# Patient Record
Sex: FEMALE | Race: BLACK OR AFRICAN AMERICAN | Employment: PART TIME | URBAN - METROPOLITAN AREA
[De-identification: names, ages, dates, MRNs, and addresses within clinical notes are randomized per-mention and may not be internally consistent; named-entity substitution may affect disease eponyms.]

---

## 2018-03-22 ENCOUNTER — OP HISTORICAL/CONVERTED ENCOUNTER (OUTPATIENT)
Dept: OTHER | Age: 28
End: 2018-03-22

## 2019-11-05 ENCOUNTER — ED HISTORICAL/CONVERTED ENCOUNTER (OUTPATIENT)
Dept: OTHER | Age: 29
End: 2019-11-05

## 2022-12-12 ENCOUNTER — HOSPITAL ENCOUNTER (EMERGENCY)
Age: 32
Discharge: HOME | End: 2022-12-12
Attending: EMERGENCY MEDICINE
Payer: MEDICAID

## 2022-12-12 VITALS
HEART RATE: 93 BPM | RESPIRATION RATE: 15 BRPM | DIASTOLIC BLOOD PRESSURE: 85 MMHG | HEIGHT: 66 IN | SYSTOLIC BLOOD PRESSURE: 130 MMHG | OXYGEN SATURATION: 100 % | TEMPERATURE: 98.3 F | BODY MASS INDEX: 19.29 KG/M2 | WEIGHT: 120 LBS

## 2022-12-12 DIAGNOSIS — R45.851 SUICIDAL IDEATION: Primary | ICD-10-CM

## 2022-12-12 DIAGNOSIS — F19.10 SUBSTANCE ABUSE (HCC): ICD-10-CM

## 2022-12-12 LAB
ALBUMIN SERPL-MCNC: 3.1 G/DL (ref 3.5–5)
ALBUMIN/GLOB SERPL: 0.8 {RATIO} (ref 1.1–2.2)
ALP SERPL-CCNC: 62 U/L (ref 45–117)
ALT SERPL-CCNC: 22 U/L (ref 12–78)
AMPHET UR QL SCN: NEGATIVE
ANION GAP SERPL CALC-SCNC: 8 MMOL/L (ref 5–15)
APPEARANCE UR: ABNORMAL
AST SERPL W P-5'-P-CCNC: 17 U/L (ref 15–37)
BACTERIA URNS QL MICRO: ABNORMAL /HPF
BARBITURATES UR QL SCN: NEGATIVE
BASOPHILS # BLD: 0.1 K/UL (ref 0–0.1)
BASOPHILS NFR BLD: 1 % (ref 0–1)
BENZODIAZ UR QL: NEGATIVE
BILIRUB SERPL-MCNC: 0.3 MG/DL (ref 0.2–1)
BILIRUB UR QL: NEGATIVE
BUN SERPL-MCNC: 14 MG/DL (ref 6–20)
BUN/CREAT SERPL: 17 (ref 12–20)
CA-I BLD-MCNC: 8.7 MG/DL (ref 8.5–10.1)
CANNABINOIDS UR QL SCN: NEGATIVE
CHLORIDE SERPL-SCNC: 113 MMOL/L (ref 97–108)
CO2 SERPL-SCNC: 22 MMOL/L (ref 21–32)
COCAINE UR QL SCN: POSITIVE
COLOR UR: ABNORMAL
CREAT SERPL-MCNC: 0.84 MG/DL (ref 0.55–1.02)
DIFFERENTIAL METHOD BLD: NORMAL
DRUG SCRN COMMENT,DRGCM: ABNORMAL
EOSINOPHIL # BLD: 0.3 K/UL (ref 0–0.4)
EOSINOPHIL NFR BLD: 4 % (ref 0–7)
ERYTHROCYTE [DISTWIDTH] IN BLOOD BY AUTOMATED COUNT: 13.7 % (ref 11.5–14.5)
GLOBULIN SER CALC-MCNC: 3.8 G/DL (ref 2–4)
GLUCOSE SERPL-MCNC: 99 MG/DL (ref 65–100)
GLUCOSE UR STRIP.AUTO-MCNC: NEGATIVE MG/DL
HCT VFR BLD AUTO: 38.6 % (ref 35–47)
HGB BLD-MCNC: 12.5 G/DL (ref 11.5–16)
HGB UR QL STRIP: ABNORMAL
IMM GRANULOCYTES # BLD AUTO: 0 K/UL (ref 0–0.04)
IMM GRANULOCYTES NFR BLD AUTO: 0 % (ref 0–0.5)
KETONES UR QL STRIP.AUTO: NEGATIVE MG/DL
LEUKOCYTE ESTERASE UR QL STRIP.AUTO: ABNORMAL
LYMPHOCYTES # BLD: 1.8 K/UL (ref 0.8–3.5)
LYMPHOCYTES NFR BLD: 24 % (ref 12–49)
MCH RBC QN AUTO: 28 PG (ref 26–34)
MCHC RBC AUTO-ENTMCNC: 32.4 G/DL (ref 30–36.5)
MCV RBC AUTO: 86.5 FL (ref 80–99)
METHADONE UR QL: NEGATIVE
MONOCYTES # BLD: 0.5 K/UL (ref 0–1)
MONOCYTES NFR BLD: 6 % (ref 5–13)
MUCOUS THREADS URNS QL MICRO: ABNORMAL /LPF
NEUTS SEG # BLD: 4.8 K/UL (ref 1.8–8)
NEUTS SEG NFR BLD: 65 % (ref 32–75)
NITRITE UR QL STRIP.AUTO: POSITIVE
NRBC # BLD: 0 K/UL (ref 0–0.01)
NRBC BLD-RTO: 0 PER 100 WBC
OPIATES UR QL: NEGATIVE
PCP UR QL: NEGATIVE
PH UR STRIP: 5 [PH] (ref 5–8)
PLATELET # BLD AUTO: 304 K/UL (ref 150–400)
PMV BLD AUTO: 9.5 FL (ref 8.9–12.9)
POTASSIUM SERPL-SCNC: 3.8 MMOL/L (ref 3.5–5.1)
PROT SERPL-MCNC: 6.9 G/DL (ref 6.4–8.2)
PROT UR STRIP-MCNC: ABNORMAL MG/DL
RBC # BLD AUTO: 4.46 M/UL (ref 3.8–5.2)
RBC #/AREA URNS HPF: >100 /HPF (ref 0–5)
SODIUM SERPL-SCNC: 143 MMOL/L (ref 136–145)
SP GR UR REFRACTOMETRY: 1.02 (ref 1–1.03)
UA: UC IF INDICATED,UAUC: ABNORMAL
UROBILINOGEN UR QL STRIP.AUTO: 0.1 EU/DL (ref 0.1–1)
WBC # BLD AUTO: 7.4 K/UL (ref 3.6–11)
WBC URNS QL MICRO: ABNORMAL /HPF (ref 0–4)

## 2022-12-12 PROCEDURE — 99283 EMERGENCY DEPT VISIT LOW MDM: CPT

## 2022-12-12 PROCEDURE — 80053 COMPREHEN METABOLIC PANEL: CPT

## 2022-12-12 PROCEDURE — 36415 COLL VENOUS BLD VENIPUNCTURE: CPT

## 2022-12-12 PROCEDURE — 87186 SC STD MICRODIL/AGAR DIL: CPT

## 2022-12-12 PROCEDURE — 87086 URINE CULTURE/COLONY COUNT: CPT

## 2022-12-12 PROCEDURE — 81001 URINALYSIS AUTO W/SCOPE: CPT

## 2022-12-12 PROCEDURE — 87077 CULTURE AEROBIC IDENTIFY: CPT

## 2022-12-12 PROCEDURE — 85025 COMPLETE CBC W/AUTO DIFF WBC: CPT

## 2022-12-12 PROCEDURE — 80307 DRUG TEST PRSMV CHEM ANLYZR: CPT

## 2022-12-12 NOTE — ED PROVIDER NOTES
EMERGENCY DEPARTMENT HISTORY AND PHYSICAL EXAM      Date: 2022  Patient Name: Angelique Maurer    History of Presenting Illness     Chief Complaint   Patient presents with    Mental Health Problem       History Provided By: Patient and Law Enforcement    HPI: Angelique Maurer, 33 y.o. female presents to the emergency department under an E CO taken out by her sister due to patient having declared suicidal ideation. Patient also admits to substance abuse. Patient denies AVH, denies any somatic complaints at this time. There are no other complaints, changes, or physical findings at this time. Past History     Past Medical History:  Past Medical History:   Diagnosis Date    Laceration     right 2 3 fingers    Pregnancy     13 weeks    Smoker        Past Surgical History:  Past Surgical History:   Procedure Laterality Date    HX  SECTION      HX CYST REMOVAL      mult surgeries on pylonidal cyst    HX HERNIA REPAIR         Family History:  No family history on file. Social History:  Social History     Tobacco Use    Smoking status: Some Days   Substance Use Topics    Alcohol use: No       Allergies: Allergies   Allergen Reactions    Tylenol [Acetaminophen] Nausea Only    Zofran [Ondansetron Hcl (Pf)] Swelling       PCP: Swapnil, Not On File, [place]    No current facility-administered medications on file prior to encounter. Current Outpatient Medications on File Prior to Encounter   Medication Sig Dispense Refill    oxyCODONE IR (ROXICODONE) 10 mg tab immediate release tablet Take 1 Tab by mouth every four (4) hours as needed for Pain. Max Daily Amount: 60 mg. 40 Tab 0       Review of Systems   Review of Systems  Review of Systems   Constitutional: Negative for chills and fever. HENT: Negative for sinus pressure and sinus pain. Eyes: Negative for photophobia and redness. Respiratory: Negative for shortness of breath and wheezing. Cardiovascular: Negative for chest pain and palpitations. Gastrointestinal: Negative for abdominal pain and nausea. Genitourinary: Negative for flank pain and hematuria. Musculoskeletal: Negative for arthralgias and gait problem. Skin: Negative for color change and pallor. Neurological: Negative for dizziness and weakness. Physical Exam   Physical Exam  Physical Exam  Constitutional:       General: No acute distress. Appearance: Normal appearance. Not toxic-appearing. HENT:      Head: Normocephalic and atraumatic. Nose: Nose normal.      Mouth/Throat:      Mouth: Mucous membranes are moist.   Eyes:      Extraocular Movements: Extraocular movements intact. Pupils: Pupils are equal, round, and reactive to light. Cardiovascular:      Rate and Rhythm: Normal rate. Pulses: Normal pulses. Pulmonary:      Effort: Pulmonary effort is normal.      Breath sounds: No stridor. Abdominal:      General: Abdomen is flat. There is no distension. Musculoskeletal:         General: Normal range of motion. Cervical back: Normal range of motion and neck supple. Skin:     General: Skin is warm and dry. Capillary Refill: Capillary refill takes less than 2 seconds. Neurological:      General: No focal deficit present. Mental Status: Aert and oriented to person, place, and time.    Psychiatric:         Mood and Affect: Mood abnormal.         Behavior: Behavior abnormal.     Lab and Diagnostic Study Results   Labs -     Recent Results (from the past 12 hour(s))   CBC WITH AUTOMATED DIFF    Collection Time: 12/12/22  5:02 PM   Result Value Ref Range    WBC 7.4 3.6 - 11.0 K/uL    RBC 4.46 3.80 - 5.20 M/uL    HGB 12.5 11.5 - 16.0 g/dL    HCT 38.6 35.0 - 47.0 %    MCV 86.5 80.0 - 99.0 FL    MCH 28.0 26.0 - 34.0 PG    MCHC 32.4 30.0 - 36.5 g/dL    RDW 13.7 11.5 - 14.5 %    PLATELET 221 256 - 062 K/uL    MPV 9.5 8.9 - 12.9 FL    NRBC 0.0 0.0  WBC    ABSOLUTE NRBC 0.00 0.00 - 0.01 K/uL    NEUTROPHILS 65 32 - 75 %    LYMPHOCYTES 24 12 - 49 %    MONOCYTES 6 5 - 13 %    EOSINOPHILS 4 0 - 7 %    BASOPHILS 1 0 - 1 %    IMMATURE GRANULOCYTES 0 0 - 0.5 %    ABS. NEUTROPHILS 4.8 1.8 - 8.0 K/UL    ABS. LYMPHOCYTES 1.8 0.8 - 3.5 K/UL    ABS. MONOCYTES 0.5 0.0 - 1.0 K/UL    ABS. EOSINOPHILS 0.3 0.0 - 0.4 K/UL    ABS. BASOPHILS 0.1 0.0 - 0.1 K/UL    ABS. IMM. GRANS. 0.0 0.00 - 0.04 K/UL    DF AUTOMATED     METABOLIC PANEL, COMPREHENSIVE    Collection Time: 12/12/22  5:02 PM   Result Value Ref Range    Sodium 143 136 - 145 mmol/L    Potassium 3.8 3.5 - 5.1 mmol/L    Chloride 113 (H) 97 - 108 mmol/L    CO2 22 21 - 32 mmol/L    Anion gap 8 5 - 15 mmol/L    Glucose 99 65 - 100 mg/dL    BUN 14 6 - 20 mg/dL    Creatinine 0.84 0.55 - 1.02 mg/dL    BUN/Creatinine ratio 17 12 - 20      eGFR >60 >60 ml/min/1.73m2    Calcium 8.7 8.5 - 10.1 mg/dL    Bilirubin, total 0.3 0.2 - 1.0 mg/dL    AST (SGOT) 17 15 - 37 U/L    ALT (SGPT) 22 12 - 78 U/L    Alk.  phosphatase 62 45 - 117 U/L    Protein, total 6.9 6.4 - 8.2 g/dL    Albumin 3.1 (L) 3.5 - 5.0 g/dL    Globulin 3.8 2.0 - 4.0 g/dL    A-G Ratio 0.8 (L) 1.1 - 2.2     URINALYSIS W/ REFLEX CULTURE    Collection Time: 12/12/22  5:02 PM    Specimen: Urine   Result Value Ref Range    Color Juanita      Appearance Turbid (A) Clear      Specific gravity 1.025 1.003 - 1.030      pH (UA) 5.0 5.0 - 8.0      Protein Trace (A) Negative mg/dL    Glucose Negative Negative mg/dL    Ketone Negative Negative mg/dL    Bilirubin Negative Negative      Blood Large (A) Negative      Urobilinogen 0.1 0.1 - 1.0 EU/dL    Nitrites Positive (A) Negative      Leukocyte Esterase Small (A) Negative      UA:UC IF INDICATED Urine Culture Ordered (A) Culture not indicated by UA result      WBC 20-50 0 - 4 /hpf    RBC >100 (H) 0 - 5 /hpf    Bacteria 4+ (A) Negative /hpf    Mucus 2+ (A) Negative /lpf   DRUG SCREEN, URINE    Collection Time: 12/12/22  5:02 PM   Result Value Ref Range    AMPHETAMINES Negative Negative      BARBITURATES Negative Negative BENZODIAZEPINES Negative Negative      COCAINE Positive (A) Negative      METHADONE Negative Negative      OPIATES Negative Negative      PCP(PHENCYCLIDINE) Negative Negative      THC (TH-CANNABINOL) Negative Negative      Drug screen comment        This test is a screen for drugs of abuse in a medical setting only (i.e., they are unconfirmed results and as such must not be used for non-medical purposes, e.g.,employment testing, legal testing). Due to its inherent nature, false positive (FP) and false negative (FN) results may be obtained. Therefore, if necessary for medical care, recommend confirmation of positive findings by GC/MS. Radiologic Studies -   @lastxrresult@  CT Results  (Last 48 hours)      None          CXR Results  (Last 48 hours)      None            Medical Decision Making and ED Course   Differential Diagnosis & Medical Decision Making Provider Note:       - I am the first provider for this patient. I reviewed the vital signs, available nursing notes, past medical history, past surgical history, family history and social history. The patients presenting problems have been discussed, and they are in agreement with the care plan formulated and outlined with them. I have encouraged them to ask questions as they arise throughout their visit. Vital Signs-Reviewed the patient's vital signs. Patient Vitals for the past 12 hrs:   Temp Pulse Resp BP SpO2   12/12/22 1653 98.3 °F (36.8 °C) 93 15 130/85 100 %       ED Course:   Patient with passive suicidal ideation and substance abuse. Needs to be evaluated by D 19. Disposition   Disposition: Awaiting evaluation by 19 to determine admission versus discharge home. Diagnosis/Clinical Impression     Clinical Impression:   1. Suicidal ideation    2. Substance abuse [facility])        Attestations: Lolis Carbajal MD, am the primary clinician of record.     Please note that this dictation was completed with [employer], the [employer] voice recognition software. Quite often unanticipated grammatical, syntax, homophones, and other interpretive errors are inadvertently transcribed by the computer software. Please disregard these errors. Please excuse any errors that have escaped final proofreading. Thank you.

## 2022-12-12 NOTE — BSMART NOTE
Pt presents to ER on ECO taken out by sister, executed by En Hare at 1610PM.  [facility] with D19 aware that pt is here and will call to prescreen pt

## 2022-12-12 NOTE — Clinical Note
[facility] EMERGENCY DEPT  [address] [place] [id number]  [phone]    Work/School Note    Date: 12/12/2022    To Whom It May concern:      Veronique Cota was seen and treated today in the emergency room by the following provider(s):  Attending Provider: Nitish Mendieta MD  Physician: Gary Marcano MD.      Veronique Cota is excused from work/school on 12/12/22. She is clear to return to work/school on 12/13/22.         Sincerely,          Tamia George MD

## 2022-12-12 NOTE — ED TRIAGE NOTES
[facility] 15 pt's sister placed an ECO stating that pt is in crisis and sent text messages to her mother expressing suicidal thoughts; sister placed in ECO paperwork that pt is on crack and heroin, has 6 children ages 6-3, has her electric and gas shut off, that she is bipolar and can be aggressive; pt denies SI/HI; states that she started using drugs when her friend who was missing turned up dead about 2 years ago; pt denies any thoughts of SI/HI

## 2022-12-12 NOTE — BSMART NOTE
Juanita with D19 completing prescreen at this time via [address] [place] [address].   All available labs, face sheet, and copy of ECO faxed per her request

## 2022-12-13 NOTE — DISCHARGE INSTRUCTIONS
[facility]   [address], [facility], [address] Crisis [phone]  *D-19 2000 [facility], 454 [facility] or Crisis [phone] Toll free [address], [employer], 6901 [facility] or [phone] for [facility]   [address] Jared[address]rag[address] Crisis [phone]  [address], [place], [address] Crisis [phone]  [address] Neck Multiple locations in [place], [place], 3 [facility] [address], [address] [facility] [address], [address] Crisis [phone]  [facility] 51 [facility], [facility]     Other [address] Providers with Sliding Scale or [address], [place], [address] [facility] [address]  [facility] 150 Medical [facility], [address] [facility], 1008 [facility]  [employer] [address], [facility], 1515 [facility], [address]  Ya 5800 [facility], 1000 [facility] Psychiatry [phone]    Psychiatrists and Nurse Practitioners   (Most of these practices also have therapists)    [address] 100 [facility] [place], 100 [facility] [address], [place], 1015 Keisha [address]cl[address] 72, [place], [facility] 16 [address] [place], [address]40 [place] [address] [place]  [facility] [phone], [facility] [phone], [place] [phone], and [address]  7213 [facility] or [facility] call [phone] or [place] call [phone]  [facility] Neighbor [facility]4 1106 [facility], [place], 2801 [facility] 1465 [facility] [address] [place], 1421 [facility] 119 8550 S [facility], 1306 [facility] Virtual appts. [facility] [address], [place], 3330 [address]  *Arabella Soriano NP [place], 9601 [facility] Online   [facility] Megan Deocleciano [address]41, Anisha, [address]  Neuropsychiatric and Counseling 53 [facility], [place], 15 [facility]  610 [facility] Counseling [address], [place], 1550 [facility]              (Primarily therapy practice but there is NP here does medication management)  [address] 96 [facility], [address] [facility]  7500 [facility], [place], [address] Have [place] and [place] locations also. Virtual appointments also.     [facility] 529 [facility], [address]  [address] 201 [facility], 1035 Hollis Abraham [place]  [facility], 700 [facility] 621 , [place], [address] [phone] and [place] [phone]    Therapists  *Carlitos Bower, [address] 2606 [facility] [place] 301 [facility] 83,8Th Floor 1 [place], 3800 [facility] [phone] or YOXTTDFI [phone]  *[facility]  2801 [place], [address] 2401 [facility] [address], [place] [address] [place], [place] 5546 [address] [place] [address] Dr [facility] 1801 [facility] 224 [facility] [phone]   *[facility] [address] [place] 5546 [facility].  [address] Janina franklin, 403 N [facility] t

## 2022-12-13 NOTE — ED NOTES
Pt refusing to dress in gown at this time. Wants to wait until she finds out what is going to happen.

## 2022-12-15 LAB
BACTERIA SPEC CULT: ABNORMAL
COLONY COUNT,CNT: ABNORMAL
SPECIAL REQUESTS,SREQ: ABNORMAL

## 2023-12-31 ENCOUNTER — HOSPITAL ENCOUNTER (EMERGENCY)
Facility: HOSPITAL | Age: 33
Discharge: HOME | End: 2023-12-31

## 2023-12-31 VITALS
TEMPERATURE: 98.2 F | DIASTOLIC BLOOD PRESSURE: 86 MMHG | OXYGEN SATURATION: 98 % | SYSTOLIC BLOOD PRESSURE: 137 MMHG | WEIGHT: 190 LBS | HEART RATE: 84 BPM | RESPIRATION RATE: 18 BRPM | BODY MASS INDEX: 30.53 KG/M2 | HEIGHT: 66 IN

## 2023-12-31 DIAGNOSIS — R11.2 NAUSEA AND VOMITING, UNSPECIFIED VOMITING TYPE: Primary | ICD-10-CM

## 2023-12-31 LAB
ALBUMIN SERPL-MCNC: 4 G/DL (ref 3.5–5)
ALBUMIN/GLOB SERPL: 0.7 (ref 1.1–2.2)
ALP SERPL-CCNC: 68 U/L (ref 45–117)
ALT SERPL-CCNC: 21 U/L (ref 12–78)
ANION GAP SERPL CALC-SCNC: 8 MMOL/L (ref 5–15)
APPEARANCE UR: ABNORMAL
AST SERPL W P-5'-P-CCNC: 25 U/L (ref 15–37)
BACTERIA URNS QL MICRO: NEGATIVE /HPF
BASOPHILS # BLD: 0.1 K/UL (ref 0–0.1)
BASOPHILS NFR BLD: 1 % (ref 0–1)
BILIRUB SERPL-MCNC: 0.6 MG/DL (ref 0.2–1)
BILIRUB UR QL: NEGATIVE
BUN SERPL-MCNC: 12 MG/DL (ref 6–20)
BUN/CREAT SERPL: 16 (ref 12–20)
CA-I BLD-MCNC: 9.4 MG/DL (ref 8.5–10.1)
CHLORIDE SERPL-SCNC: 104 MMOL/L (ref 97–108)
CO2 SERPL-SCNC: 24 MMOL/L (ref 21–32)
COLOR UR: ABNORMAL
CREAT SERPL-MCNC: 0.76 MG/DL (ref 0.55–1.02)
DIFFERENTIAL METHOD BLD: ABNORMAL
EOSINOPHIL # BLD: 0.1 K/UL (ref 0–0.4)
EOSINOPHIL NFR BLD: 1 % (ref 0–7)
EPITH CASTS URNS QL MICRO: ABNORMAL /LPF
ERYTHROCYTE [DISTWIDTH] IN BLOOD BY AUTOMATED COUNT: 16.6 % (ref 11.5–14.5)
FLUAV AG NPH QL IA: NEGATIVE
FLUBV AG NOSE QL IA: NEGATIVE
GLOBULIN SER CALC-MCNC: 5.7 G/DL (ref 2–4)
GLUCOSE SERPL-MCNC: 80 MG/DL (ref 65–100)
GLUCOSE UR STRIP.AUTO-MCNC: NEGATIVE MG/DL
HCG, URINE, POC: NEGATIVE
HCT VFR BLD AUTO: 37.8 % (ref 35–47)
HGB BLD-MCNC: 12.1 G/DL (ref 11.5–16)
HGB UR QL STRIP: ABNORMAL
IMM GRANULOCYTES # BLD AUTO: 0 K/UL (ref 0–0.04)
IMM GRANULOCYTES NFR BLD AUTO: 0 % (ref 0–0.5)
KETONES UR QL STRIP.AUTO: 20 MG/DL
LEUKOCYTE ESTERASE UR QL STRIP.AUTO: NEGATIVE
LIPASE SERPL-CCNC: 25 U/L (ref 13–75)
LYMPHOCYTES # BLD: 2.5 K/UL (ref 0.8–3.5)
LYMPHOCYTES NFR BLD: 28 % (ref 12–49)
Lab: NORMAL
MAGNESIUM SERPL-MCNC: 2.4 MG/DL (ref 1.6–2.4)
MAGNESIUM SERPL-MCNC: 2.6 MG/DL (ref 1.6–2.4)
MCH RBC QN AUTO: 26.1 PG (ref 26–34)
MCHC RBC AUTO-ENTMCNC: 32 G/DL (ref 30–36.5)
MCV RBC AUTO: 81.6 FL (ref 80–99)
MONOCYTES # BLD: 0.7 K/UL (ref 0–1)
MONOCYTES NFR BLD: 8 % (ref 5–13)
MUCOUS THREADS URNS QL MICRO: ABNORMAL /LPF
NEGATIVE QC PASS/FAIL: NORMAL
NEUTS SEG # BLD: 5.6 K/UL (ref 1.8–8)
NEUTS SEG NFR BLD: 62 % (ref 32–75)
NITRITE UR QL STRIP.AUTO: NEGATIVE
NRBC # BLD: 0 K/UL (ref 0–0.01)
NRBC BLD-RTO: 0 PER 100 WBC
PH UR STRIP: 5 (ref 5–8)
PLATELET # BLD AUTO: 480 K/UL (ref 150–400)
PMV BLD AUTO: 9.9 FL (ref 8.9–12.9)
POSITIVE QC PASS/FAIL: NORMAL
POTASSIUM SERPL-SCNC: 3.5 MMOL/L (ref 3.5–5.1)
POTASSIUM SERPL-SCNC: 3.6 MMOL/L (ref 3.5–5.1)
PROT SERPL-MCNC: 9.7 G/DL (ref 6.4–8.2)
PROT UR STRIP-MCNC: 30 MG/DL
RBC # BLD AUTO: 4.63 M/UL (ref 3.8–5.2)
RBC #/AREA URNS HPF: ABNORMAL /HPF (ref 0–5)
SARS-COV-2 RDRP RESP QL NAA+PROBE: NOT DETECTED
SODIUM SERPL-SCNC: 136 MMOL/L (ref 136–145)
SP GR UR REFRACTOMETRY: 1.02 (ref 1–1.03)
UROBILINOGEN UR QL STRIP.AUTO: 0.1 EU/DL (ref 0.1–1)
WBC # BLD AUTO: 8.9 K/UL (ref 3.6–11)
WBC URNS QL MICRO: ABNORMAL /HPF (ref 0–4)

## 2023-12-31 PROCEDURE — 83690 ASSAY OF LIPASE: CPT

## 2023-12-31 PROCEDURE — 96361 HYDRATE IV INFUSION ADD-ON: CPT

## 2023-12-31 PROCEDURE — 36415 COLL VENOUS BLD VENIPUNCTURE: CPT

## 2023-12-31 PROCEDURE — 99284 EMERGENCY DEPT VISIT MOD MDM: CPT

## 2023-12-31 PROCEDURE — 80053 COMPREHEN METABOLIC PANEL: CPT

## 2023-12-31 PROCEDURE — 84132 ASSAY OF SERUM POTASSIUM: CPT

## 2023-12-31 PROCEDURE — 87804 INFLUENZA ASSAY W/OPTIC: CPT

## 2023-12-31 PROCEDURE — 83735 ASSAY OF MAGNESIUM: CPT

## 2023-12-31 PROCEDURE — 2580000003 HC RX 258

## 2023-12-31 PROCEDURE — 87635 SARS-COV-2 COVID-19 AMP PRB: CPT

## 2023-12-31 PROCEDURE — 81001 URINALYSIS AUTO W/SCOPE: CPT

## 2023-12-31 PROCEDURE — 6360000002 HC RX W HCPCS

## 2023-12-31 PROCEDURE — 85025 COMPLETE CBC W/AUTO DIFF WBC: CPT

## 2023-12-31 PROCEDURE — 96374 THER/PROPH/DIAG INJ IV PUSH: CPT

## 2023-12-31 RX ORDER — PROMETHAZINE HYDROCHLORIDE 12.5 MG/1
12.5 TABLET ORAL 4 TIMES DAILY PRN
Qty: 20 TABLET | Refills: 0 | Status: SHIPPED | OUTPATIENT
Start: 2023-12-31 | End: 2024-01-07

## 2023-12-31 RX ORDER — 0.9 % SODIUM CHLORIDE 0.9 %
1000 INTRAVENOUS SOLUTION INTRAVENOUS ONCE
Status: COMPLETED | OUTPATIENT
Start: 2023-12-31 | End: 2023-12-31

## 2023-12-31 RX ORDER — METOCLOPRAMIDE HYDROCHLORIDE 5 MG/ML
10 INJECTION INTRAMUSCULAR; INTRAVENOUS
Status: COMPLETED | OUTPATIENT
Start: 2023-12-31 | End: 2023-12-31

## 2023-12-31 RX ADMIN — SODIUM CHLORIDE 1000 ML: 9 INJECTION, SOLUTION INTRAVENOUS at 18:27

## 2023-12-31 RX ADMIN — METOCLOPRAMIDE 10 MG: 5 INJECTION, SOLUTION INTRAMUSCULAR; INTRAVENOUS at 18:26

## 2023-12-31 ASSESSMENT — LIFESTYLE VARIABLES
HOW OFTEN DO YOU HAVE A DRINK CONTAINING ALCOHOL: NEVER
HOW MANY STANDARD DRINKS CONTAINING ALCOHOL DO YOU HAVE ON A TYPICAL DAY: PATIENT DOES NOT DRINK

## 2023-12-31 ASSESSMENT — PAIN DESCRIPTION - LOCATION: LOCATION: HEAD

## 2023-12-31 ASSESSMENT — PAIN SCALES - GENERAL: PAINLEVEL_OUTOF10: 7

## 2023-12-31 NOTE — ED PROVIDER NOTES
[facility] EMERGENCY DEPT  EMERGENCY DEPARTMENT HISTORY AND PHYSICAL EXAM      Date: 2023  Patient Name: Spencer Bradley  MRN: [id number]  YOB: 1990  Date of evaluation: 2023  Provider: Edilson Ceja PA-C   Note Started: 5:16 PM EST 23    HISTORY OF PRESENT ILLNESS     Chief Complaint   Patient presents with    Emesis       History Provided By: Patient    HPI: Spencer Bradley is a 33 y.o. female with past medical history as listed below presents emergency department complaining of persistent nausea and vomiting weakness that started yesterday.  Also reporting increased fatigue and nasal drainage coming from the right nostril.  Patient reports she has a history of an abscessed tooth that was pulled in May of this year states that she had a lot of drainage right nostril before her tooth was extracted and believes that it was the abscess draining from her nostril.  Is concerned that she is having an abscess draining from her nose. denies any fevers, chills, night sweats, photophobia, sore throat, known sick contacts, chest pain, shortness of breath, change in bowel or bladder habits      PAST MEDICAL HISTORY   Past Medical History:  Past Medical History:   Diagnosis Date    Laceration     right 2 3 fingers    Pregnancy     13 weeks    Smoker        Past Surgical History:  Past Surgical History:   Procedure Laterality Date     SECTION      CYST REMOVAL      mult surgeries on pylonidal cyst    HERNIA REPAIR         Family History:  History reviewed. No pertinent family history.    Social History:  Social History     Tobacco Use    Smoking status: Some Days   Substance Use Topics    Alcohol use: No       Allergies:  Allergies   Allergen Reactions    Ondansetron Swelling and Anaphylaxis    Acetaminophen Nausea Only       PCP: No primary care provider on file.    Current Meds:   No current facility-administered medications for this encounter.     Current Outpatient Medications   Medication Sig

## 2023-12-31 NOTE — ED TRIAGE NOTES
Pt complains of vomiting and weakness that started yesterday. States she has large amount of nasal drainage coming out of the right nostril.

## 2024-01-01 NOTE — ED NOTES
Assumed care of pt at this time. Pt is alert and oriented x4 with no signs of respiratory distress. Pt is in TC5 with no complaints at this time. Pt is receiving normal saline bolus.

## 2024-09-04 ENCOUNTER — HOSPITAL ENCOUNTER (EMERGENCY)
Facility: HOSPITAL | Age: 34
Discharge: HOME | End: 2024-09-04
Attending: EMERGENCY MEDICINE

## 2024-09-04 VITALS
WEIGHT: 145 LBS | HEART RATE: 97 BPM | DIASTOLIC BLOOD PRESSURE: 84 MMHG | RESPIRATION RATE: 18 BRPM | BODY MASS INDEX: 23.3 KG/M2 | SYSTOLIC BLOOD PRESSURE: 142 MMHG | HEIGHT: 66 IN | TEMPERATURE: 98.3 F | OXYGEN SATURATION: 98 %

## 2024-09-04 DIAGNOSIS — B96.89 BACTERIAL VAGINOSIS: Primary | ICD-10-CM

## 2024-09-04 DIAGNOSIS — N76.0 BACTERIAL VAGINOSIS: Primary | ICD-10-CM

## 2024-09-04 DIAGNOSIS — Z20.2 POSSIBLE EXPOSURE TO STI: ICD-10-CM

## 2024-09-04 DIAGNOSIS — H60.391 INFECTIVE OTITIS EXTERNA OF RIGHT EAR: ICD-10-CM

## 2024-09-04 LAB
CLUE CELLS VAG QL WET PREP: ABNORMAL
T VAGINALIS VAG QL WET PREP: ABNORMAL
YEAST: ABNORMAL

## 2024-09-04 PROCEDURE — 6360000002 HC RX W HCPCS

## 2024-09-04 PROCEDURE — 87210 SMEAR WET MOUNT SALINE/INK: CPT

## 2024-09-04 PROCEDURE — 87491 CHLMYD TRACH DNA AMP PROBE: CPT

## 2024-09-04 PROCEDURE — 96372 THER/PROPH/DIAG INJ SC/IM: CPT

## 2024-09-04 PROCEDURE — 2500000003 HC RX 250 WO HCPCS

## 2024-09-04 PROCEDURE — 99284 EMERGENCY DEPT VISIT MOD MDM: CPT

## 2024-09-04 PROCEDURE — 6370000000 HC RX 637 (ALT 250 FOR IP)

## 2024-09-04 PROCEDURE — 87591 N.GONORRHOEAE DNA AMP PROB: CPT

## 2024-09-04 RX ORDER — PERMETHRIN 50 MG/G
CREAM TOPICAL
Qty: 60 G | Refills: 0 | Status: SHIPPED | OUTPATIENT
Start: 2024-09-04

## 2024-09-04 RX ORDER — HYDROXYZINE HYDROCHLORIDE 10 MG/1
50 TABLET, FILM COATED ORAL 3 TIMES DAILY PRN
COMMUNITY

## 2024-09-04 RX ORDER — CIPROFLOXACIN/HYDROCORTISONE 0.2 %-1 %
3 SUSPENSION, DROPS(FINAL DOSAGE FORM)(ML) OTIC (EAR) 2 TIMES DAILY
Qty: 10 ML | Refills: 0 | Status: SHIPPED | OUTPATIENT
Start: 2024-09-04 | End: 2024-09-11

## 2024-09-04 RX ORDER — DOXYCYCLINE HYCLATE 100 MG
100 TABLET ORAL 2 TIMES DAILY
Qty: 14 TABLET | Refills: 0 | Status: SHIPPED | OUTPATIENT
Start: 2024-09-04 | End: 2024-09-11

## 2024-09-04 RX ORDER — FAMOTIDINE 20 MG/1
TABLET, FILM COATED ORAL
COMMUNITY

## 2024-09-04 RX ORDER — OLANZAPINE 7.5 MG/1
7.5 TABLET, FILM COATED ORAL NIGHTLY
COMMUNITY

## 2024-09-04 RX ORDER — METRONIDAZOLE 500 MG/1
500 TABLET ORAL 2 TIMES DAILY
Qty: 14 TABLET | Refills: 0 | Status: SHIPPED | OUTPATIENT
Start: 2024-09-04 | End: 2024-09-11

## 2024-09-04 RX ORDER — DOXYCYCLINE 100 MG/1
100 CAPSULE ORAL ONCE
Status: COMPLETED | OUTPATIENT
Start: 2024-09-04 | End: 2024-09-04

## 2024-09-04 RX ADMIN — LIDOCAINE HYDROCHLORIDE 500 MG: 10 INJECTION, SOLUTION EPIDURAL; INFILTRATION; INTRACAUDAL; PERINEURAL at 17:44

## 2024-09-04 RX ADMIN — DOXYCYCLINE HYCLATE 100 MG: 100 CAPSULE ORAL at 17:44

## 2024-09-04 ASSESSMENT — PAIN SCALES - GENERAL
PAINLEVEL_OUTOF10: 0
PAINLEVEL_OUTOF10: 0

## 2024-09-04 ASSESSMENT — PAIN - FUNCTIONAL ASSESSMENT
PAIN_FUNCTIONAL_ASSESSMENT: 0-10
PAIN_FUNCTIONAL_ASSESSMENT: 0-10

## 2024-09-04 NOTE — DISCHARGE INSTRUCTIONS
Thank you for choosing our Emergency Department for your care.  It is our privilege to care for you in your time of need.  In the next several days, you may receive a survey via email or mailed to your home about your experience with our team.  We would greatly appreciate you taking a few minutes to complete the survey, as we use this information to learn what we have done well and what we could be doing better. Thank you for trusting us with your care!    Below you will find a list of your tests from today's visit.   Labs  Recent Results (from the past 12 hour(s))   Wet prep, genital    Collection Time: 09/04/24  5:26 PM    Specimen: Miscellaneous sample   Result Value Ref Range    Clue Cells, Wet Prep Clue Cells present (A) NONE SEEN      Trich, Wet Prep NONE SEEN NONE SEEN      Yeast, UA NONE SEEN NONE SEEN         Radiologic Studies  No orders to display     ------------------------------------------------------------------------------------------------------------  The evaluation and treatment you received in the Emergency Department were for an urgent problem. It is important that you follow-up with a doctor, nurse practitioner, or physician assistant to:  (1) confirm your diagnosis,  (2) re-evaluation of changes in your illness and treatment, and (3) for ongoing care. Please take your discharge instructions with you when you go to your follow-up appointment.     If you have any problem arranging a follow-up appointment, contact us!  If your symptoms become worse or you do not improve as expected, please return to us. We are available 24 hours a day.     If a prescription has been provided, please fill it as soon as possible to prevent a delay in treatment. If you have any questions or reservations about taking the medication due to side effects or interactions with other medications, please call your primary care provider or contact us directly.  Again, THANK YOU for choosing us to care for YOU!

## 2024-09-04 NOTE — ED TRIAGE NOTES
Started months ago  States she is    Picking at ears, says she felt something hard in both ears 3 months ago, havent been to   Not bothering her now.  3 wks ago ears swollen with fluid and draining.  Hx of abscess in mouth in July,   No issues with hearing or muffled sounds  Sores in skin all over body states she picks at them.  Hydrocortisone helped itching, but blisters dont go away

## 2024-09-04 NOTE — ED PROVIDER NOTES
[facility] EMERGENCY DEPT  EMERGENCY DEPARTMENT HISTORY AND PHYSICAL EXAM      Date: 2024  Patient Name: Spencer Bradley  MRN: [id number]  Birthdate 1990  Date of evaluation: 2024  Provider: CYNTHIA Vizcaino   Note Started: 5:01 PM EDT 24    HISTORY OF PRESENT ILLNESS     Chief Complaint   Patient presents with    Foreign Body in Ear    Blister     History Provided By: Patient    HPI: Spencer Bradley is a 33 y.o. female with past medical history as listed below who presents to the ED with several complaints.  First, she is complaining of a sensation of foreign body in both ears, in addition to some drainage and irritation for several weeks. Second, she is complaining of a rash and and is concerned that she may have scabies. She describes the rash as pruritic and states that it has been present for several months.  Lastly, when her partner left the room, she expressed concerns about possible exposure to STIs and is requesting prophylactic treatment.  She states that she feels safe at home and denies any physical or verbal abuse. She also states that she was recently released from senior care.     PAST MEDICAL HISTORY   Past Medical History:  Past Medical History:   Diagnosis Date    Anxiety     Depression     Laceration     right 2 3 fingers    Pregnancy     13 weeks    Smoker        Past Surgical History:  Past Surgical History:   Procedure Laterality Date     SECTION      CYST REMOVAL      mult surgeries on pylonidal cyst    HERNIA REPAIR         Family History:  History reviewed. No pertinent family history.    Social History:  Social History     Tobacco Use    Smoking status: Every Day     Current packs/day: 0.50     Types: Cigarettes   Substance Use Topics    Alcohol use: Yes    Drug use: Never       Allergies:  Allergies   Allergen Reactions    Ondansetron Swelling and Anaphylaxis    Acetaminophen Nausea Only       PCP: Consuelo Perry FNP    Current Meds:   No current facility-administered

## 2024-09-06 LAB
C TRACH DNA SPEC QL NAA+PROBE: NEGATIVE
N GONORRHOEA DNA SPEC QL NAA+PROBE: NEGATIVE
SAMPLE TYPE: NORMAL
SERVICE CMNT-IMP: NORMAL
SPECIMEN SOURCE: NORMAL

## 2025-04-22 ENCOUNTER — HOSPITAL ENCOUNTER (EMERGENCY)
Facility: HOSPITAL | Age: 35
Discharge: HOME | End: 2025-04-22
Payer: MEDICAID

## 2025-04-22 VITALS
HEART RATE: 102 BPM | DIASTOLIC BLOOD PRESSURE: 83 MMHG | RESPIRATION RATE: 18 BRPM | OXYGEN SATURATION: 96 % | SYSTOLIC BLOOD PRESSURE: 124 MMHG | TEMPERATURE: 98.4 F

## 2025-04-22 DIAGNOSIS — R21 RASH AND OTHER NONSPECIFIC SKIN ERUPTION: Primary | ICD-10-CM

## 2025-04-22 PROCEDURE — 99282 EMERGENCY DEPT VISIT SF MDM: CPT

## 2025-04-22 ASSESSMENT — PAIN SCALES - GENERAL: PAINLEVEL_OUTOF10: 0

## 2025-04-23 NOTE — ED PROVIDER NOTES
[facility] EMERGENCY DEPT  EMERGENCY DEPARTMENT HISTORY AND PHYSICAL EXAM      Date of evaluation: 2025  Patient Name: Spencer Bradley  Birthdate 1990  MRN: [id number]  ED Provider: Rebecca Glover PA-C   Note Started: 8:59 PM EDT 25    HISTORY OF PRESENT ILLNESS     Chief Complaint   Patient presents with    Skin Problem       History Provided By: Patient, spouse     HPI: Spencer Bradley is a 34 y.o. female who presents ambulatory to the ED with spouse for reports of \"rash\" and \"movement under skin\" that has been intermittently present over the past few months.  Patient states that she has never seen the \"rash\" but that her spouse has. She states she does feel her \"skin moving.\"  She states that it primarily happens when smoking.  She does not elaborate on what she has been smoking.  She has not been previously evaluated for these concerns.  Patient does report picking at her skin occasionally. No other systemic symptoms.      PAST MEDICAL HISTORY   Past Medical History:  Past Medical History:   Diagnosis Date    Anxiety     Depression     Laceration     right 2 3 fingers    Pregnancy     13 weeks    Smoker        Past Surgical History:  Past Surgical History:   Procedure Laterality Date     SECTION      CYST REMOVAL      mult surgeries on pylonidal cyst    HERNIA REPAIR         Family History:  No family history on file.    Social History:  Social History     Tobacco Use    Smoking status: Every Day     Current packs/day: 0.50     Types: Cigarettes   Substance Use Topics    Alcohol use: Yes    Drug use: Never       Allergies:  Allergies   Allergen Reactions    Ondansetron Swelling and Anaphylaxis    Acetaminophen Nausea Only       PCP: Consuelo Perry FNP    Current Meds:   No current facility-administered medications for this encounter.     Current Outpatient Medications   Medication Sig Dispense Refill    hydrOXYzine HCl (ATARAX) 10 MG tablet Take 5 tablets by mouth 3 times daily as needed for